# Patient Record
Sex: FEMALE | Race: OTHER | Employment: FULL TIME | URBAN - METROPOLITAN AREA
[De-identification: names, ages, dates, MRNs, and addresses within clinical notes are randomized per-mention and may not be internally consistent; named-entity substitution may affect disease eponyms.]

---

## 2022-11-25 ENCOUNTER — HOSPITAL ENCOUNTER (OUTPATIENT)
Age: 42
Discharge: HOME | End: 2022-11-25
Payer: COMMERCIAL

## 2022-11-25 VITALS
SYSTOLIC BLOOD PRESSURE: 127 MMHG | RESPIRATION RATE: 16 BRPM | HEART RATE: 86 BPM | OXYGEN SATURATION: 100 % | DIASTOLIC BLOOD PRESSURE: 80 MMHG | TEMPERATURE: 98 F

## 2022-11-25 DIAGNOSIS — J01.90 ACUTE NON-RECURRENT SINUSITIS, UNSPECIFIED LOCATION: Primary | ICD-10-CM

## 2022-11-25 LAB
POCT INFLUENZA A: NEGATIVE
POCT INFLUENZA B: NEGATIVE

## 2022-11-25 RX ORDER — AMOXICILLIN AND CLAVULANATE POTASSIUM 875; 125 MG/1; MG/1
1 TABLET, FILM COATED ORAL 2 TIMES DAILY
Qty: 14 TABLET | Refills: 0 | Status: SHIPPED | OUTPATIENT
Start: 2022-11-25 | End: 2022-12-02

## 2022-11-25 NOTE — ED INITIAL ASSESSMENT (HPI)
Patient with chest and sinus congestion, cough started 8 days ago.   Cold medications taken at home  Daughter is sick with a cold

## 2023-04-15 ENCOUNTER — APPOINTMENT (OUTPATIENT)
Dept: GENERAL RADIOLOGY | Age: 43
End: 2023-04-15
Attending: NURSE PRACTITIONER
Payer: COMMERCIAL

## 2023-04-15 ENCOUNTER — HOSPITAL ENCOUNTER (OUTPATIENT)
Age: 43
Discharge: HOME | End: 2023-04-15
Payer: COMMERCIAL

## 2023-04-15 VITALS
HEART RATE: 56 BPM | TEMPERATURE: 98 F | OXYGEN SATURATION: 99 % | DIASTOLIC BLOOD PRESSURE: 66 MMHG | SYSTOLIC BLOOD PRESSURE: 125 MMHG | RESPIRATION RATE: 17 BRPM

## 2023-04-15 DIAGNOSIS — R07.89 CHEST PAIN, MUSCULOSKELETAL: Primary | ICD-10-CM

## 2023-04-15 LAB
#MXD IC: 0.5 X10ˆ3/UL (ref 0.1–1)
ATRIAL RATE: 54 BPM
B-HCG UR QL: NEGATIVE
BUN BLD-MCNC: 11 MG/DL (ref 7–18)
CHLORIDE BLD-SCNC: 105 MMOL/L (ref 98–112)
CO2 BLD-SCNC: 25 MMOL/L (ref 21–32)
CREAT BLD-MCNC: 0.6 MG/DL
DDIMER WHOLE BLOOD: <200 NG/ML DDU (ref ?–400)
GFR SERPLBLD BASED ON 1.73 SQ M-ARVRAT: 114 ML/MIN/1.73M2 (ref 60–?)
GLUCOSE BLD-MCNC: 96 MG/DL (ref 70–99)
HCT VFR BLD AUTO: 36.7 %
HCT VFR BLD CALC: 41 %
HGB BLD-MCNC: 12.3 G/DL
ISTAT IONIZED CALCIUM FOR CHEM 8: 1.21 MMOL/L (ref 1.12–1.32)
LYMPHOCYTES # BLD AUTO: 1.7 X10ˆ3/UL (ref 1–4)
LYMPHOCYTES NFR BLD AUTO: 28.7 %
MCH RBC QN AUTO: 28.1 PG (ref 26–34)
MCHC RBC AUTO-ENTMCNC: 33.5 G/DL (ref 31–37)
MCV RBC AUTO: 84 FL (ref 80–100)
MIXED CELL %: 8.5 %
NEUTROPHILS # BLD AUTO: 3.8 X10ˆ3/UL (ref 1.5–7.7)
NEUTROPHILS NFR BLD AUTO: 62.8 %
P AXIS: 36 DEGREES
P-R INTERVAL: 174 MS
PLATELET # BLD AUTO: 293 X10ˆ3/UL (ref 150–450)
POTASSIUM BLD-SCNC: 3.7 MMOL/L (ref 3.6–5.1)
Q-T INTERVAL: 432 MS
QRS DURATION: 74 MS
QTC CALCULATION (BEZET): 409 MS
R AXIS: 57 DEGREES
RBC # BLD AUTO: 4.37 X10ˆ6/UL
SODIUM BLD-SCNC: 141 MMOL/L (ref 136–145)
T AXIS: 33 DEGREES
TROPONIN I BLD-MCNC: <0.02 NG/ML
VENTRICULAR RATE: 54 BPM
WBC # BLD AUTO: 6 X10ˆ3/UL (ref 4–11)

## 2023-04-15 PROCEDURE — 84484 ASSAY OF TROPONIN QUANT: CPT | Performed by: NURSE PRACTITIONER

## 2023-04-15 PROCEDURE — 99214 OFFICE O/P EST MOD 30 MIN: CPT | Performed by: NURSE PRACTITIONER

## 2023-04-15 PROCEDURE — 93000 ELECTROCARDIOGRAM COMPLETE: CPT | Performed by: NURSE PRACTITIONER

## 2023-04-15 PROCEDURE — 80047 BASIC METABLC PNL IONIZED CA: CPT | Performed by: NURSE PRACTITIONER

## 2023-04-15 PROCEDURE — 71046 X-RAY EXAM CHEST 2 VIEWS: CPT | Performed by: NURSE PRACTITIONER

## 2023-04-15 PROCEDURE — 85025 COMPLETE CBC W/AUTO DIFF WBC: CPT | Performed by: NURSE PRACTITIONER

## 2023-04-15 PROCEDURE — 96360 HYDRATION IV INFUSION INIT: CPT | Performed by: NURSE PRACTITIONER

## 2023-04-15 PROCEDURE — 81025 URINE PREGNANCY TEST: CPT | Performed by: NURSE PRACTITIONER

## 2023-04-15 RX ORDER — CYCLOBENZAPRINE HCL 10 MG
TABLET ORAL 3 TIMES DAILY PRN
Qty: 15 TABLET | Refills: 0 | Status: SHIPPED | OUTPATIENT
Start: 2023-04-15 | End: 2023-04-22

## 2023-04-15 RX ORDER — 0.9 % SODIUM CHLORIDE 0.9 %
1000 INTRAVENOUS SOLUTION INTRAVENOUS ONCE
Status: COMPLETED | OUTPATIENT
Start: 2023-04-15 | End: 2023-04-15

## 2023-04-15 NOTE — PROGRESS NOTES
Patient with near syncopal episode after IV insertion. IVF initiated and patient reclined in chair. No injury   Patient feeling better with interventions.

## 2023-04-15 NOTE — ED INITIAL ASSESSMENT (HPI)
Since Monday night, patient with dull ache over left chest area. Patient states that pain is constant and reproducible with movement and stretching arm. On Thursday, patient with some SOB while on walk. Yesterday, patient was golfing and pain returned when she swung the gold club.      Patient contributes pain to when she had her back cracked by her family while laying on stomach on Saturday, then pain started 1 day later    No viral sx   No night sweats/ chills     Pain reproducible when area pressed on

## 2024-02-26 ENCOUNTER — TELEMEDICINE (OUTPATIENT)
Dept: INTERNAL MEDICINE CLINIC | Facility: CLINIC | Age: 44
End: 2024-02-26

## 2024-02-26 DIAGNOSIS — R73.03 BORDERLINE DIABETES: Primary | ICD-10-CM

## 2024-02-26 DIAGNOSIS — K76.0 FATTY LIVER: ICD-10-CM

## 2024-02-26 DIAGNOSIS — N92.6 IRREGULAR PERIODS: ICD-10-CM

## 2024-02-26 DIAGNOSIS — Z00.00 PHYSICAL EXAM, ANNUAL: ICD-10-CM

## 2024-02-26 DIAGNOSIS — Z12.31 SCREENING MAMMOGRAM, ENCOUNTER FOR: ICD-10-CM

## 2024-02-26 DIAGNOSIS — Z01.419 ENCOUNTER FOR ROUTINE GYNECOLOGICAL EXAMINATION WITH PAPANICOLAOU SMEAR OF CERVIX: ICD-10-CM

## 2024-02-26 NOTE — PROGRESS NOTES
Patient ID: Ovidio Ponce is a 43 year old female.  No chief complaint on file.         HISTORY OF PRESENT ILLNESS:   Patient presents for above.  This visit is conducted using Telemedicine with live, interactive video and audio.  C/c new pt   C/o moved to [place] 2 yrs ago from the [facility]   Has been told she had fatty liver   Has irregular periods   Gyn -used to be seen in the city     PMH  H/o gestational diabetes was on insulin with second daughter but watched what she ate so no insulin   Borderline diabetes       Lives with  and 3 daughters  and works in [employer]       Review [employer]   MEDICAL HISTORY:   History reviewed. No pertinent past medical history.    Past Surgical History:   Procedure Laterality Date    OTHER SURGICAL HISTORY      lithotripsy x 2 for kidney stones       No current outpatient medications on file.    Allergies:No Known Allergies    Social History     Socioeconomic History    Marital status:      Spouse name: Not on file    Number of children: Not on file    Years of education: Not on file    Highest education level: Not on file   Occupational History    Not on file   Tobacco Use    Smoking status: Never    Smokeless tobacco: Never   Vaping Use    Vaping Use: Never used   Substance and Sexual Activity    Alcohol use: Never    Drug use: Never    Sexual activity: Not on file   Other Topics Concern    Not on file   Social History Narrative    Not on file     Social Determinants of Health     Financial Resource Strain: Not on file   Food Insecurity: Not on file   Transportation Needs: Not on file   Physical Activity: Not on file   Stress: Not on file   Social Connections: Not on file   Housing Stability: Not on file       PHYSICAL EXAM:   Unable to perform vitals or do physical exam as this is a virtual video visit.  Patient appears alert  And oriented x 3, no acute distress  Patient speaking complete sentences without any conversational dyspnea or respiratory distress  No  coughing heard      ASSESSMENT/PLAN:   1. Borderline diabetes  - Hemoglobin A1C    2. Fatty liver    3. Irregular periods  - OBG - INTERNAL    4. Screening mammogram, encounter for  - [facility] NIDHI 2D+3D SCREENING BILAT (CPT=77067/89268); Future    5. Physical exam, annual  - Comp Metabolic Panel (14)  - Lipid Panel  - Assay, Thyroid Stim Hormone  - CBC, Platelet; No Differential  - Hemoglobin A1C    6. Encounter for routine gynecological examination with Papanicolaou smear of cervix  - OBG - INTERNAL  Ordered labs to be done fasting before her visit  Follow a low-carb low sugar diet and continue following a healthy diet and watching what she eats and exercising and we will check A1c  For her fatty liver we will check her liver enzymes first and may need imaging studies done    No follow-ups on file.    Time spent on encounter  22 minutes   Video time 22 minutes   Documentation time 22 minutes     Josedouglas Kris understands video evaluation is not a substitute for face-to-face examination or emergency care. Patient advised to go to ER or call 911 for worsening symptoms or acute distress.     Telehealth outside of [facility]  Telehealth Verbal Consent   I conducted a telehealth visit with Ovidio Ponce today, 02/26/24, which was completed using two-way, real-time interactive audio and video communication. This has been done in good jacki to provide continuity of care in the best interest of the provider-patient relationship, due to the COVID -19 public health crisis/national emergency where restrictions of face-to-face office visits are ongoing. Every conscious effort was taken to allow for sufficient and adequate time to complete the visit.  The patient was made aware of the limitations of the telehealth visit, including treatment limitations as no physical exam could be performed.  The patient was advised to call 911 or to go to the ER in case there was an emergency.  The patient was also advised of the potential  privacy & security concerns related to the telehealth platform.   The patient was made aware of where to find [facility]'s notice of privacy practices, telehealth consent form and other related consent forms and documents.  which are located on the [facility] website. The patient verbally agreed to telehealth consent form, related consents and the risks discussed.    Lastly, the patient confirmed that they were in [place].   Included in this visit, time may have been spent reviewing labs, medications, radiology tests and decision making. Appropriate medical decision-making and tests are ordered as detailed in the plan of care above.  Coding/billing information is submitted for this visit based on complexity of care and/or time spent for the visit.    This note was prepared using Dragon Medical voice recognition dictation software. As a result errors may occur. When identified these errors have been corrected. While every attempt is made to correct errors during dictation discrepancies may still exist.    Yessi Lindo MD  2/26/2024

## 2024-03-07 ENCOUNTER — TELEPHONE (OUTPATIENT)
Dept: OBGYN CLINIC | Facility: CLINIC | Age: 44
End: 2024-03-07

## 2024-03-07 NOTE — TELEPHONE ENCOUNTER
Provider EB canceling office 04/01/24. LM on pt's voicemail that appointment on 04/01 will be canceled and to call back to reschedule. [employer] message sent to pt.

## 2024-03-15 ENCOUNTER — HOSPITAL ENCOUNTER (OUTPATIENT)
Dept: MAMMOGRAPHY | Age: 44
Discharge: HOME | End: 2024-03-15
Attending: INTERNAL MEDICINE
Payer: COMMERCIAL

## 2024-03-15 DIAGNOSIS — Z12.31 SCREENING MAMMOGRAM, ENCOUNTER FOR: ICD-10-CM

## 2024-03-15 PROCEDURE — 77067 SCR MAMMO BI INCL CAD: CPT | Performed by: INTERNAL MEDICINE

## 2024-03-15 PROCEDURE — 77063 BREAST TOMOSYNTHESIS BI: CPT | Performed by: INTERNAL MEDICINE

## 2024-03-18 ENCOUNTER — LAB ENCOUNTER (OUTPATIENT)
Dept: LAB | Age: 44
End: 2024-03-18
Attending: OBSTETRICS & GYNECOLOGY
Payer: COMMERCIAL

## 2024-03-18 ENCOUNTER — OFFICE VISIT (OUTPATIENT)
Dept: SURGERY | Facility: CLINIC | Age: 44
End: 2024-03-18
Payer: COMMERCIAL

## 2024-03-18 VITALS
SYSTOLIC BLOOD PRESSURE: 110 MMHG | DIASTOLIC BLOOD PRESSURE: 78 MMHG | HEIGHT: 65 IN | WEIGHT: 145 LBS | BODY MASS INDEX: 24.16 KG/M2

## 2024-03-18 DIAGNOSIS — Z01.419 WOMEN'S ANNUAL ROUTINE GYNECOLOGICAL EXAMINATION: ICD-10-CM

## 2024-03-18 DIAGNOSIS — N92.6 IRREGULAR PERIODS: ICD-10-CM

## 2024-03-18 DIAGNOSIS — Z12.4 SCREENING FOR CERVICAL CANCER: Primary | ICD-10-CM

## 2024-03-18 LAB
ALBUMIN SERPL-MCNC: 4.7 G/DL (ref 3.2–4.8)
ALBUMIN/GLOB SERPL: 1.7 {RATIO} (ref 1–2)
ALP LIVER SERPL-CCNC: 58 U/L
ALT SERPL-CCNC: 16 U/L
ANION GAP SERPL CALC-SCNC: 6 MMOL/L (ref 0–18)
AST SERPL-CCNC: 16 U/L (ref ?–34)
BILIRUB SERPL-MCNC: 0.5 MG/DL (ref 0.3–1.2)
BUN BLD-MCNC: 12 MG/DL (ref 9–23)
BUN/CREAT SERPL: 17.4 (ref 10–20)
CALCIUM BLD-MCNC: 9.5 MG/DL (ref 8.7–10.4)
CHLORIDE SERPL-SCNC: 109 MMOL/L (ref 98–112)
CHOLEST SERPL-MCNC: 189 MG/DL (ref ?–200)
CO2 SERPL-SCNC: 28 MMOL/L (ref 21–32)
CREAT BLD-MCNC: 0.69 MG/DL
DEPRECATED RDW RBC AUTO: 37.6 FL (ref 35.1–46.3)
EGFRCR SERPLBLD CKD-EPI 2021: 110 ML/MIN/1.73M2 (ref 60–?)
ERYTHROCYTE [DISTWIDTH] IN BLOOD BY AUTOMATED COUNT: 11.8 % (ref 11–15)
EST. AVERAGE GLUCOSE BLD GHB EST-MCNC: 114 MG/DL (ref 68–126)
FASTING PATIENT LIPID ANSWER: NO
FASTING STATUS PATIENT QL REPORTED: NO
FSH SERPL-ACNC: 55 MIU/ML
GLOBULIN PLAS-MCNC: 2.7 G/DL (ref 2.8–4.4)
GLUCOSE BLD-MCNC: 111 MG/DL (ref 70–99)
HBA1C MFR BLD: 5.6 % (ref ?–5.7)
HCT VFR BLD AUTO: 39.7 %
HDLC SERPL-MCNC: 74 MG/DL (ref 40–59)
HGB BLD-MCNC: 12.7 G/DL
LDLC SERPL CALC-MCNC: 103 MG/DL (ref ?–100)
LH SERPL-ACNC: 17.1 MIU/ML
MCH RBC QN AUTO: 27.8 PG (ref 26–34)
MCHC RBC AUTO-ENTMCNC: 32 G/DL (ref 31–37)
MCV RBC AUTO: 86.9 FL
NONHDLC SERPL-MCNC: 115 MG/DL (ref ?–130)
OSMOLALITY SERPL CALC.SUM OF ELEC: 296 MOSM/KG (ref 275–295)
PLATELET # BLD AUTO: 291 10(3)UL (ref 150–450)
POTASSIUM SERPL-SCNC: 4.2 MMOL/L (ref 3.5–5.1)
PROT SERPL-MCNC: 7.4 G/DL (ref 5.7–8.2)
RBC # BLD AUTO: 4.57 X10(6)UL
SODIUM SERPL-SCNC: 143 MMOL/L (ref 136–145)
T4 FREE SERPL-MCNC: 1.2 NG/DL (ref 0.8–1.7)
TRIGL SERPL-MCNC: 64 MG/DL (ref 30–149)
TSI SER-ACNC: 1.99 MIU/ML (ref 0.55–4.78)
VLDLC SERPL CALC-MCNC: 11 MG/DL (ref 0–30)
WBC # BLD AUTO: 5.2 X10(3) UL (ref 4–11)

## 2024-03-18 PROCEDURE — 83001 ASSAY OF GONADOTROPIN (FSH): CPT

## 2024-03-18 PROCEDURE — 80061 LIPID PANEL: CPT | Performed by: INTERNAL MEDICINE

## 2024-03-18 PROCEDURE — 80053 COMPREHEN METABOLIC PANEL: CPT | Performed by: INTERNAL MEDICINE

## 2024-03-18 PROCEDURE — 83002 ASSAY OF GONADOTROPIN (LH): CPT

## 2024-03-18 PROCEDURE — 85027 COMPLETE CBC AUTOMATED: CPT | Performed by: INTERNAL MEDICINE

## 2024-03-18 PROCEDURE — 84439 ASSAY OF FREE THYROXINE: CPT | Performed by: INTERNAL MEDICINE

## 2024-03-18 PROCEDURE — 87624 HPV HI-RISK TYP POOLED RSLT: CPT | Performed by: OBSTETRICS & GYNECOLOGY

## 2024-03-18 PROCEDURE — 83036 HEMOGLOBIN GLYCOSYLATED A1C: CPT | Performed by: INTERNAL MEDICINE

## 2024-03-18 PROCEDURE — 36415 COLL VENOUS BLD VENIPUNCTURE: CPT | Performed by: INTERNAL MEDICINE

## 2024-03-18 PROCEDURE — 84410 TESTOSTERONE BIOAVAILABLE: CPT

## 2024-03-18 PROCEDURE — 84443 ASSAY THYROID STIM HORMONE: CPT | Performed by: INTERNAL MEDICINE

## 2024-03-18 RX ORDER — SUMATRIPTAN 50 MG/1
TABLET, FILM COATED ORAL
COMMUNITY

## 2024-03-18 RX ORDER — MULTIVIT-MIN/IRON/FOLIC ACID/K 18-600-40
CAPSULE ORAL
COMMUNITY

## 2024-03-18 NOTE — PROGRESS NOTES
NEW GYN H&P     3/18/2024  10:13 AM    Chief Complaint   Patient presents with    New Patient     Annual exam/pap     Irregular Periods     Pt c/o irregular periods after stopping ocp's in 10/2023   .    HPI: Patient is a 43 year old  LMP 10/1/2023 here to establish care - due for annual exam and PAP. Reports longstanding irregular periods since  that began after COVID vaccine that improved when placed on OCPS but unable to tolerate side effects so discontinued 10/2023 with no further periods since then. Minimal vasomotor symptoms other than mild night sweats. Discussed evaluation with hormonal profile and TVUSN to assess ovaries and uterus then returning to review results. No other gynecologic concerns or complaints.    Patient's last menstrual period was 10/01/2023 (approximate).    OB History    Para Term  AB Living   3 3 2 1   3   SAB IAB Ectopic Multiple Live Births           3      # Outcome Date GA Lbr Jay/2nd Weight Sex Delivery Anes PTL Lv   3 Term     F NORMAL SPONT   ZAINAB   2 Term 11 40w0d   F NORMAL SPONT   ZAINAB   1  08 36w0d   F NORMAL SPONT   ZAINAB       GYN hx:    Hx Prior Abnormal Pap: No  Pap Date: 10/01/22  Pap Result Notes: per pt last pap done 10/2022 with prev GYNE  Follow Up Recommendation: Last Mammo: 03/15/2024-still pending  CONTRACEPTION: None  LAST MAMMOGRAM: 3/15/2024 - results pending      Current Outpatient Medications   Medication Sig Dispense Refill    SUMAtriptan 50 MG Oral Tab Take one tablet at the first sign of a headache, May repeat in 2 hours if needed, a third dose may be taken 8 hours if needed. No more three doses in 24 hours.      Cholecalciferol (VITAMIN D) 50 MCG ( UT) Oral Cap Take by mouth.         Past Medical History:   Diagnosis Date    Anxiety When I started birth control a couple a year ago    Depression [id number]    Hyperlipidemia     Irregular periods      Past Surgical History:   Procedure Laterality Date     OTHER SURGICAL HISTORY      lithotripsy x 2 for kidney stones     No Known Allergies  Family History   Problem Relation Age of Onset    Other (hyperthyroidism) Mother     Hypertension Father     Diabetes Father     Heart Disorder Father     Ovarian Cancer Maternal Aunt         post    Breast Cancer Paternal Aunt         age unk    Pancreatic Cancer Maternal Uncle         post     Social History     Socioeconomic History    Marital status:    Tobacco Use    Smoking status: Never     Passive exposure: Yes    Smokeless tobacco: Never    Tobacco comments:     Sparingly during that time   Vaping Use    Vaping Use: Never used   Substance and Sexual Activity    Alcohol use: Never    Drug use: Never     Social History     Social History Narrative    Not on file       ROS:     Review of Systems:  A comprehensive 10 point ROS was completed. All pertinent positives and negatives noted in the HPI.     /78   Ht 65\"   Wt 145 lb (65.8 kg)   LMP 10/01/2023 (Approximate)   BMI 24.13 kg/m²     Exam:   GENERAL: well developed, well nourished, in no apparent distress  SKIN: no rashes, no lesions  HEENT: normal  LUNGS: respiration unlabored  CARDIOVASCULAR: no peripheral edema or varicosities, skin warm and dry  BREASTS: bilaterally nontender, no palpable masses, no nipple discharge, no skin changes, no axillary adenopathy  ABDOMEN: Soft, non distended; non tender, no masses  GYNE/:   External Genitalia: normal, no lesions, good perineal support  Urethra: meatus normal   Bladder: well supported  Vagina: normal mucosa, no lesions, no discharge   Uterus: normal size, mobile, nontender  Cervix: normal os, no lesions or bleeding  Adnexa:normal size, bilaterally nontender, no palpable masses  Cul-de-sac: normal  R/V: normal perineum, no hemorrhoids  EXTREMITIES:  nontender without edema      A/P: Patient is 43 year old female     1. Women's annual routine gynecological examination  - PAP today    2. Irregular periods  -  Pelvic USN ordered  - Check ovarian labs      Total time spent = 30 minutes  >50% visit = face to face counseling and coordination of care        3/18/2024  Marlene Newell MD

## 2024-03-21 LAB
SEX HORM BIND GLOB: 48 NMOL/L
TESTOST % FREE+WEAK BND: 9.2 %
TESTOST FREE+WEAK BND: 1.4 NG/DL
TESTOSTERONE TOT /MS: 15.6 NG/DL

## 2024-03-21 NOTE — PROGRESS NOTES
Released to [employer] and message from provider/results was viewed by patient.    Seen by patient Ovidio Ponce on 3/18/2024  8:55 PM

## 2024-04-01 ENCOUNTER — OFFICE VISIT (OUTPATIENT)
Dept: INTERNAL MEDICINE CLINIC | Facility: CLINIC | Age: 44
End: 2024-04-01

## 2024-04-01 VITALS
BODY MASS INDEX: 24.06 KG/M2 | RESPIRATION RATE: 18 BRPM | DIASTOLIC BLOOD PRESSURE: 69 MMHG | SYSTOLIC BLOOD PRESSURE: 102 MMHG | OXYGEN SATURATION: 99 % | HEART RATE: 64 BPM | WEIGHT: 144.38 LBS | HEIGHT: 65 IN

## 2024-04-01 DIAGNOSIS — E07.89 THYROID FULLNESS: ICD-10-CM

## 2024-04-01 DIAGNOSIS — Z00.00 PHYSICAL EXAM, ANNUAL: Primary | ICD-10-CM

## 2024-04-01 DIAGNOSIS — E55.9 VITAMIN D DEFICIENCY: ICD-10-CM

## 2024-04-01 DIAGNOSIS — Z12.11 COLON CANCER SCREENING: ICD-10-CM

## 2024-04-01 PROCEDURE — 99396 PREV VISIT EST AGE 40-64: CPT | Performed by: INTERNAL MEDICINE

## 2024-04-01 NOTE — PROGRESS NOTES
Ovidio Ponce is a 43 year old female.  Chief Complaint   Patient presents with    Physical     Annual        HPI:   Pt comes for her  annual physical  C/C annual physical  C/o overall doing well  Came back from [place]-encounter for work          HISTORY  2/2024  C/c new pt   C/o moved to [place] 2 yrs ago from the [facility]   Has been told she had fatty liver   Has irregular periods   Gyn -used to be seen in the city      PMH  H/o gestational diabetes was on insulin with second daughter but watched what she ate so no insulin   Borderline diabetes   Vit d def         Lives with  and 3 daughters  and works in HR      Current Outpatient Medications   Medication Sig Dispense Refill    SUMAtriptan 50 MG Oral Tab Take one tablet at the first sign of a headache, May repeat in 2 hours if needed, a third dose may be taken 8 hours if needed. No more three doses in 24 hours.      Cholecalciferol (VITAMIN D) 50 MCG (2000 UT) Oral Cap Take by mouth.        Past Medical History:   Diagnosis Date    Anxiety When I started birth control a couple a year ago    Calculus of kidney 2018    Depression [id number]    Hyperlipidemia 2022    Irregular periods       Past Surgical History:   Procedure Laterality Date    Other surgical history      lithotripsy x 2 for kidney stones      Social History:  Social History     Socioeconomic History    Marital status:    Tobacco Use    Smoking status: Passive Smoke Exposure - Never Smoker     Passive exposure: Yes    Smokeless tobacco: Never    Tobacco comments:     Sparingly during that time   Vaping Use    Vaping Use: Never used   Substance and Sexual Activity    Alcohol use: Never    Drug use: Never   Other Topics Concern    Caffeine Concern No    Exercise Yes     Comment: 2/3 times a week    Seat Belt Yes    Special Diet No    Stress Concern Yes    Weight Concern No        REVIEW OF SYSTEMS:   GENERAL HEALTH: No fevers, chills, sweats, fatigue  VISION: No recent vision problems,  blurry vision or double vision-wears contacts   HEENT: No decreased hearing ear pain nasal congestion or sore throat  SKIN: denies any unusual skin lesions or rashes  RESPIRATORY: denies shortness of breath, cough, wheezing  CARDIOVASCULAR: denies chest pain on exertion, palpitations, swelling in feet  GI: denies abdominal pain and denies heartburn, nausea or vomiting  : No Pain on urination, change in the color of urine, discharge, urinating frequently  MUS: No back pain, joint pain, muscle pain  NEURO: denies headaches- occ  , no anxiety, depression- better since stopping the ocps     EXAM:   /69 (BP Location: Right arm, Patient Position: Sitting, Cuff Size: adult)   Pulse 64   Resp 18   Ht 5' 5\" (1.651 m)   Wt 144 lb 6.4 oz (65.5 kg)   LMP 10/01/2023 (Approximate)   SpO2 99%   BMI 24.03 kg/m²   GENERAL: well developed, well nourished,in no apparent distress  SKIN: no rashes,no suspicious lesions  HEENT: atraumatic, normocephalic,ears and throat are clear, frontal maxillary sinus tenderness, pupils equal reactive to light bilaterally, extract muscles intact  NECK: supple,no adenopathy, + fullness of the an neck   LUNGS: clear to auscultation, no wheeze  CARDIO: RRR without murmur  GI: good BS's,no masses or tenderness  EXTREMITIES: no cyanosis, or edema    ASSESSMENT AND PLAN:   Diagnoses and all orders for this visit:    Physical exam, annual  Advised patient to watch her sheets exercise, seatbelt use no texting driving, sunscreen use advised  Colon cancer screening  -     [facility] GI Telephone Colon Screen  -     [facility]STRO - INTERNAL  Will refer  Vitamin D deficiency  -     Vitamin D; Future  Reordered to be done with the next blood draw  Thyroid fullness  -     US THYROID (CPT=76536); Future  Will check      Preventative medicine  Labs- 3/2024  Mammogram 3/2024   Pap smear 3/2024 dr nowak   Colonoscopy-refer    The patient indicates understanding of these issues and agrees to the plan.  No follow-ups  on file.

## 2024-04-04 ENCOUNTER — HOSPITAL ENCOUNTER (OUTPATIENT)
Dept: ULTRASOUND IMAGING | Age: 44
Discharge: HOME | End: 2024-04-04
Attending: INTERNAL MEDICINE
Payer: COMMERCIAL

## 2024-04-04 ENCOUNTER — LAB ENCOUNTER (OUTPATIENT)
Dept: LAB | Facility: REFERENCE LAB | Age: 44
End: 2024-04-04
Attending: INTERNAL MEDICINE
Payer: COMMERCIAL

## 2024-04-04 DIAGNOSIS — E55.9 VITAMIN D DEFICIENCY: ICD-10-CM

## 2024-04-04 DIAGNOSIS — E07.89 THYROID FULLNESS: ICD-10-CM

## 2024-04-04 LAB — VIT D+METAB SERPL-MCNC: 20.6 NG/ML (ref 30–100)

## 2024-04-04 PROCEDURE — 82306 VITAMIN D 25 HYDROXY: CPT

## 2024-04-04 PROCEDURE — 36415 COLL VENOUS BLD VENIPUNCTURE: CPT

## 2024-04-04 PROCEDURE — 76536 US EXAM OF HEAD AND NECK: CPT | Performed by: INTERNAL MEDICINE

## 2024-04-05 DIAGNOSIS — E55.9 VITAMIN D DEFICIENCY: Primary | ICD-10-CM

## 2024-04-05 RX ORDER — ERGOCALCIFEROL 1.25 MG/1
50000 CAPSULE ORAL WEEKLY
Qty: 4 CAPSULE | Refills: 3 | Status: SHIPPED | OUTPATIENT
Start: 2024-04-05

## 2024-04-07 ENCOUNTER — OFFICE VISIT (OUTPATIENT)
Dept: FAMILY MEDICINE CLINIC | Facility: CLINIC | Age: 44
End: 2024-04-07
Payer: COMMERCIAL

## 2024-04-07 VITALS
OXYGEN SATURATION: 96 % | SYSTOLIC BLOOD PRESSURE: 119 MMHG | TEMPERATURE: 98 F | HEART RATE: 69 BPM | BODY MASS INDEX: 23.66 KG/M2 | WEIGHT: 142 LBS | RESPIRATION RATE: 18 BRPM | HEIGHT: 65 IN | DIASTOLIC BLOOD PRESSURE: 74 MMHG

## 2024-04-07 DIAGNOSIS — J06.9 VIRAL URI WITH COUGH: Primary | ICD-10-CM

## 2024-04-07 PROCEDURE — 99213 OFFICE O/P EST LOW 20 MIN: CPT | Performed by: NURSE PRACTITIONER

## 2024-04-24 ENCOUNTER — HOSPITAL ENCOUNTER (OUTPATIENT)
Dept: ULTRASOUND IMAGING | Age: 44
Discharge: HOME | End: 2024-04-24
Attending: OBSTETRICS & GYNECOLOGY
Payer: COMMERCIAL

## 2024-04-24 DIAGNOSIS — N92.6 IRREGULAR PERIODS: ICD-10-CM

## 2024-04-24 PROCEDURE — 76830 TRANSVAGINAL US NON-OB: CPT | Performed by: OBSTETRICS & GYNECOLOGY

## 2024-04-24 PROCEDURE — 76856 US EXAM PELVIC COMPLETE: CPT | Performed by: OBSTETRICS & GYNECOLOGY

## 2024-05-03 ENCOUNTER — OFFICE VISIT (OUTPATIENT)
Dept: SURGERY | Facility: CLINIC | Age: 44
End: 2024-05-03
Payer: COMMERCIAL

## 2024-05-03 DIAGNOSIS — N92.6 IRREGULAR PERIODS: Primary | ICD-10-CM

## 2024-05-03 PROCEDURE — 99213 OFFICE O/P EST LOW 20 MIN: CPT | Performed by: OBSTETRICS & GYNECOLOGY

## 2024-05-03 NOTE — PROGRESS NOTES
Ovidio Ponce is a 43 year old female.    HPI:     Chief Complaint   Patient presents with    Other     USN follow up       USN 4/24/24 results reviewed = normal uterus with 10 mm EM lining with 2 mm calcification and normal ovaries bilaterally. Still no menses since 10/2023. Discussed time interval of 12 consecutive months without a cycle as defining menopause. Minimal hormonal or vasomotor symptoms. Will continue to track both menses and whether symptoms develop.    Medications (Active prior to today's visit):  Current Outpatient Medications   Medication Sig Dispense Refill    ergocalciferol 1.25 MG (59452 UT) Oral Cap Take 1 capsule (50,000 Units total) by mouth once a week. 4 capsule 3    SUMAtriptan 50 MG Oral Tab Take one tablet at the first sign of a headache, May repeat in 2 hours if needed, a third dose may be taken 8 hours if needed. No more three doses in 24 hours.      Cholecalciferol (VITAMIN D) 50 MCG (2000 UT) Oral Cap Take by mouth.         Allergies:  No Known Allergies    LMP 10/01/2023 (Approximate)      ASSESSMENT/PLAN:   Assessment       1. Irregular periods - perimenopausal LMP 10/2023  - Normal USN  - Track whether cycles or symptoms until menopause 10/2024      Total time spent = 20 minutes  >50% of visit = face to face discussion and coordination of care        5/3/2024  Marlene Newell MD

## 2024-08-19 DIAGNOSIS — E55.9 VITAMIN D DEFICIENCY: ICD-10-CM

## 2024-08-21 RX ORDER — ERGOCALCIFEROL 1.25 MG/1
50000 CAPSULE, LIQUID FILLED ORAL WEEKLY
Qty: 4 CAPSULE | Refills: 3 | OUTPATIENT
Start: 2024-08-21

## 2024-08-21 NOTE — TELEPHONE ENCOUNTER
Please review. Protocol Failed; No Protocol        Component  Ref Range & Units 4/4/24 11:37 AM   Vitamin D, 25OH, Total  30.0 - 100.0 ng/mL 20.6 Low           Requested Prescriptions   Pending Prescriptions Disp Refills    ERGOCALCIFEROL 1.25 MG (54829 UT) Oral Cap [Pharmacy Med Name: VITAMIN D2 50,000IU (ERGO) CAP RX] 4 capsule 3     Sig: TAKE 1 CAPSULE BY MOUTH 1 TIME A WEEK       There is no refill protocol information for this order            Future Appointments         Provider Department Appt Notes    In 1 month GI COLON SCREENING [facility] [phone] TELEPHONE COLON SCREENING          Recent Outpatient Visits              3 months ago Irregular periods    [facility], [place] - OB/GYN Marlene Newell MD    Office Visit    4 months ago Viral URI with cough    [facility], [facility], [facility] Nora Jovel APRN    Office Visit    4 months ago Physical exam, annual    [facility]    Office Visit    5 months ago Screening for cervical cancer    [facility], [place], [place] - OB/Marlene Burns MD    Office Visit    5 months ago [facility]line [facility]    [facility]icine

## 2024-09-07 ENCOUNTER — HOSPITAL ENCOUNTER (OUTPATIENT)
Age: 44
Discharge: HOME | End: 2024-09-07
Payer: COMMERCIAL

## 2024-09-07 VITALS
SYSTOLIC BLOOD PRESSURE: 110 MMHG | DIASTOLIC BLOOD PRESSURE: 69 MMHG | HEART RATE: 57 BPM | RESPIRATION RATE: 18 BRPM | OXYGEN SATURATION: 100 % | TEMPERATURE: 98 F

## 2024-09-07 DIAGNOSIS — B35.1 ONYCHOMYCOSIS OF LEFT GREAT TOE: Primary | ICD-10-CM

## 2024-09-07 PROCEDURE — 99213 OFFICE O/P EST LOW 20 MIN: CPT | Performed by: PHYSICIAN ASSISTANT

## 2024-09-07 NOTE — ED PROVIDER NOTES
Patient Seen in: [facility]      History     Chief Complaint   Patient presents with    Rash Skin Problem     Stated Complaint: toe pain    Subjective:   HPI    43-year-old female presenting for evaluation of a discolored toenail.  Patient states she removed the nail polish from her toes a few days ago and noted the left great toenail to be yellow/brown in color.  There is no associated pain, drainage to the nail.  She is concerned that the nail appears thicker than the others.    Objective:   Past Medical History:    Anxiety    Calculus of kidney    Depression    Hyperlipidemia    Irregular periods              Past Surgical History:   Procedure Laterality Date    Other surgical history      lithotripsy x 2 for kidney stones                Social History     Socioeconomic History    Marital status:    Tobacco Use    Smoking status: Passive Smoke Exposure - Never Smoker     Passive exposure: Yes    Smokeless tobacco: Never    Tobacco comments:     Sparingly during that time   Vaping Use    Vaping status: Never Used   Substance and Sexual Activity    Alcohol use: Never    Drug use: Never   Other Topics Concern    Caffeine Concern No    Exercise Yes     Comment: 2/3 times a week    Seat Belt Yes    Special Diet No    Stress Concern Yes    Weight Concern No     Social Determinants of Health     Financial Resource Strain: Not on File (9/10/2022)    [facility] from [facility]    Financial Resource Strain     Financial Resource Strain: 0   Food Insecurity: Not on File (9/10/2022)    [facility] from [facility]    Food Insecurity     Food: 0   Transportation Needs: Not on File (9/10/2022)    [facility] from [facility]    Transportation Needs     Transportation: 0   Physical Activity: Not on File (9/10/2022)    [facility] from [facility]    Physical Activity     Physical Activity: 0   Stress: Not on File (9/10/2022)    [facility] from [facility]    Stress     Stress: 0   Social Connections: Not on File  (9/10/2022)    [facility] from [facility]    Social Connections     Social Connections and Isolation: 0    Received from [place]    [facility]              Review of Systems    Positive for stated Chief Complaint: Rash Skin Problem    Other systems are as noted in HPI.  Constitutional and vital signs reviewed.      All other systems reviewed and negative except as noted above.    Physical Exam     ED Triage Vitals [09/07/24 0940]   /69   Pulse 57   Resp 18   Temp 98.1 °F (36.7 °C)   Temp src Oral   SpO2 100 %   O2 Device        Current Vitals:   Vital Signs  BP: 110/69  Pulse: 57  Resp: 18  Temp: 98.1 °F (36.7 °C)  Temp src: Oral    Oxygen Therapy  SpO2: 100 %            Physical Exam  Vitals and nursing note reviewed.   Constitutional:       General: She is not in acute distress.  HENT:      Head: Normocephalic and atraumatic.      Right Ear: External ear normal.      Left Ear: External ear normal.      Nose: Nose normal.      Mouth/Throat:      Mouth: Mucous membranes are moist.   Eyes:      Extraocular Movements: Extraocular movements intact.      Pupils: Pupils are equal, round, and reactive to light.   Cardiovascular:      Rate and Rhythm: Normal rate.   Pulmonary:      Effort: Pulmonary effort is normal.   Abdominal:      General: Abdomen is flat.   Musculoskeletal:         General: Normal range of motion.      Cervical back: Normal range of motion.      Comments: The left great toenail is brownish in color with mild thickening.   Skin:     General: Skin is warm.   Neurological:      General: No focal deficit present.      Mental Status: She is alert and oriented to person, place, and time.   Psychiatric:         Mood and Affect: Mood normal.         Behavior: Behavior normal.               ED Course   Labs Reviewed - No data to display     43-year-old female presenting with concern for discoloration to the left great toenail  Ddx-onychomycosis, ingrown toenail, paronychia    Podiatry  follow-up recommended for further evaluation and treatment.              MDM                                         Medical Decision Making      Disposition and Plan     Clinical Impression:  1. Onychomycosis of left great toe         Disposition:  Discharge  9/7/2024 10:11 am    Follow-up:  Tia Redman DPM  [address] [address]  [phone]      podiatry    Bing Olson, ALVARO  [address] [address]  [phone]                Medications Prescribed:  Discharge Medication List as of 9/7/2024 10:11 AM

## 2024-10-15 ENCOUNTER — PATIENT MESSAGE (OUTPATIENT)
Dept: INTERNAL MEDICINE CLINIC | Facility: CLINIC | Age: 44
End: 2024-10-15

## 2024-10-18 ENCOUNTER — NURSE ONLY (OUTPATIENT)
Facility: CLINIC | Age: 44
End: 2024-10-18

## 2024-10-18 NOTE — PROGRESS NOTES
GI Staff:  [facility] Colon Screening Orders    Please schedule: Colonoscopy 39451 with MAC OR IV (if appropriate)    Please send split dose Golytely bowel prep     Diagnosis: Colon Screening Z12.11      Medication adjustments:Hold Vitamins 7 days prior to the procedure.    >>>Please inform patient if new medications are started after scheduling procedure they need to call clinic to notify us.

## 2024-10-18 NOTE — PROGRESS NOTES
Called patient for scheduled telephone colon screening/positive FIT result.   Medications, pharmacy, and allergies verified with the patient.   Please advise on colonoscopy and bowel prep orders.     Age 45-64 y/o (Y/N):   66-76 y/o ? Route to GI provider per rotation schedule   › GI MD preference:   › Insurance:  [employer]  › Last PCP Visit: 4/1/2024 with Dr Lindo  IF NO PCP within [facility] ? GI in-person consultation   › Last CBC drawn: 3/18/2024  › Date of positive FIT test: N/A  › H/W/BMI: 5'5\"/142 lb/23.6    Special comments/notes:  Telephone Colon Screening Questionnaire Yes No   Are you currently experiencing any new/abnormal GI symptoms? [] [x]   If yes, explain:     Rectal bleeding? [] [x]   Black stool? [] [x]   Dysphagia or food \"feeling stuck\" when eating? [] [x]   Intractable vomiting? [] [x]   Unexplained weight loss? [] [x]   First colonoscopy? [x] []   Family history of colon cancer? [] [x]   Any issues with anesthesia? [] [x]   If yes, explain:      Have you had a stroke, heart attack or stent placement in the last 12 months?  [] [x]   If yes ? GI in-person consultation      Personal history of resp. Issues/oxygen use/BOUBACAR/COPD [] [x]   If yes, CPAP/BiPAP?     History of devices (pacemaker/defibrillator) [] [x]   History of cardiac/CVA issues/(MI/stroke) (see above) [] [x]     Medications Yes  No   Anticoagulants  Anticoagulant (Except Aspirin) ? route to RN pool to request adjustments from prescribing provider  [] [x]   Diabetic Meds  PO ? hold DAY PRIOR and DAY OF procedure  Insulin ? route to RN pool to request adjustments from prescribing provider  [] [x]   Weight loss meds (Phentermine/Vyvanse/Saxsenda/etc): [] [x]   Iron/herbal/multivitamin supplement (RX/OTC):Multivitamins, Vit D [x] []   Usage of marijuana, CBD &/or vape products: [] [x]

## 2025-01-28 ENCOUNTER — APPOINTMENT (OUTPATIENT)
Dept: GENERAL RADIOLOGY | Age: 45
End: 2025-01-28
Attending: PHYSICIAN ASSISTANT
Payer: COMMERCIAL

## 2025-01-28 ENCOUNTER — TELEPHONE (OUTPATIENT)
Dept: INTERNAL MEDICINE CLINIC | Facility: CLINIC | Age: 45
End: 2025-01-28

## 2025-01-28 ENCOUNTER — HOSPITAL ENCOUNTER (OUTPATIENT)
Age: 45
Discharge: HOME | End: 2025-01-28
Payer: COMMERCIAL

## 2025-01-28 VITALS
OXYGEN SATURATION: 98 % | TEMPERATURE: 98 F | SYSTOLIC BLOOD PRESSURE: 113 MMHG | DIASTOLIC BLOOD PRESSURE: 79 MMHG | RESPIRATION RATE: 16 BRPM | HEART RATE: 80 BPM

## 2025-01-28 DIAGNOSIS — M51.379 DEGENERATION OF INTERVERTEBRAL DISC AT L5-S1 LEVEL: ICD-10-CM

## 2025-01-28 DIAGNOSIS — M54.16 LUMBAR RADICULOPATHY: Primary | ICD-10-CM

## 2025-01-28 LAB
B-HCG UR QL: NEGATIVE
BILIRUB UR QL STRIP: NEGATIVE
CLARITY UR: CLEAR
COLOR UR: YELLOW
GLUCOSE UR STRIP-MCNC: NEGATIVE MG/DL
HGB UR QL STRIP: NEGATIVE
KETONES UR STRIP-MCNC: NEGATIVE MG/DL
LEUKOCYTE ESTERASE UR QL STRIP: NEGATIVE
NITRITE UR QL STRIP: NEGATIVE
PH UR STRIP: 6.5 [PH]
PROT UR STRIP-MCNC: NEGATIVE MG/DL
SP GR UR STRIP: 1.01
UROBILINOGEN UR STRIP-ACNC: <2 MG/DL

## 2025-01-28 PROCEDURE — 81002 URINALYSIS NONAUTO W/O SCOPE: CPT | Performed by: PHYSICIAN ASSISTANT

## 2025-01-28 PROCEDURE — 81025 URINE PREGNANCY TEST: CPT | Performed by: PHYSICIAN ASSISTANT

## 2025-01-28 PROCEDURE — 72100 X-RAY EXAM L-S SPINE 2/3 VWS: CPT | Performed by: PHYSICIAN ASSISTANT

## 2025-01-28 PROCEDURE — 99214 OFFICE O/P EST MOD 30 MIN: CPT | Performed by: PHYSICIAN ASSISTANT

## 2025-01-28 RX ORDER — METHYLPREDNISOLONE 4 MG/1
TABLET ORAL
Qty: 1 EACH | Refills: 0 | Status: SHIPPED | OUTPATIENT
Start: 2025-01-28

## 2025-01-28 NOTE — TELEPHONE ENCOUNTER
Patient called requesting an immediate care follow up was told to follow up on Monday, patient declined other providers and nurse practitioners.

## 2025-01-28 NOTE — ED PROVIDER NOTES
Chief Complaint   Patient presents with    Back Pain       HPI:     Ovidio Ponce is a 44 year old female who presents for evaluation of right lower back pain over the last 2 weeks, denies injury or trauma.  Notes history of sciatica years ago without lumbar disc disease or injury.  Patient has taken Advil previously with Tylenol this week with no medications today, pain is currently a 6 out of 10 periodically referring down the posterior right thigh over the last few days.  Notes pain was worse with ambulation over the initial few days, ongoing at rest as well as exertion.  Denies associated fevers chills headache dizziness neck pain chest pain shortness of breath vomiting diarrhea dysuria hematuria vaginal bleeding discharge upper extremity weakness numbness or swelling, notes history of kidney stones with \"very different symptoms today\".  Patient ambulating without assistance on arrival.      PFSH    PFS asessment screens reviewed and agree.  Nurses notes reviewed I agree with documentation.    Family History   Problem Relation Age of Onset    Other (hyperthyroidism) Mother     Depression Mother     Hypertension Father     Diabetes Father     Heart Disorder Father     Ovarian Cancer Maternal Aunt         post    Breast Cancer Paternal Aunt         age unk    Pancreatic Cancer Maternal Uncle         post     Family history reviewed with patient/caregiver and is not pertinent to presenting problem.  Social History     Socioeconomic History    Marital status:      Spouse name: Not on file    Number of children: Not on file    Years of education: Not on file    Highest education level: Not on file   Occupational History    Not on file   Tobacco Use    Smoking status: Passive Smoke Exposure - Never Smoker     Passive exposure: Yes    Smokeless tobacco: Never    Tobacco comments:     Sparingly during that time   Vaping Use    Vaping status: Never Used   Substance and Sexual Activity    Alcohol use: Never     Drug use: Never    Sexual activity: Not on file   Other Topics Concern    Caffeine Concern No    Exercise Yes     Comment: 2/3 times a week    Seat Belt Yes    Special Diet No    Stress Concern Yes    Weight Concern No   Social History Narrative    Not on file     Social Drivers of Health     Financial Resource Strain: Not on File (9/10/2022)    [facility] from [facility]    Financial Resource Strain     Financial Resource Strain: 0   Food Insecurity: Not on File (9/10/2022)    [facility] from [facility]    Food Insecurity     Food: 0   Transportation Needs: Not on File (9/10/2022)    [facility] from [facility]    Transportation Needs     Transportation: 0   Physical Activity: Not on File (9/10/2022)    [facility] from [facility]    Physical Activity     Physical Activity: 0   Stress: Not on File (9/10/2022)    Received from [facility]    Stress     Stress: 0   Social Connections: Not on File (9/10/2022)    Received from [facility]    Social Connections     Social Connections and Isolation: 0   Housing Stability: At Risk (8/18/2023)    [facility]     Living Situation: Not on file     Housing Problems: Not on file         ROS:   Positive for stated complaint: Back pain.  All other systems reviewed and negative except as noted above.  Constitutional and Vital Signs Reviewed.      Physical Exam:     Findings:    /79   Pulse 80   Temp 98.2 °F (36.8 °C) (Oral)   Resp 16   LMP 10/19/2024 (Approximate)   SpO2 98%   GENERAL: well developed, well nourished, well hydrated, no distress  SKIN: good skin turgor, no obvious rashes  EXTREMITIES: Mild right paralumbar tenderness, no midline tenderness, no SI joint tenderness.  Normal straight leg raise bilaterally, 2 points commission bilateral extremity intact, DP pulse intact.  No cyanosis or edema. THAKKAR without difficulty  GI: No adnexal or CVA tenderness. Abd Soft, non-tender, normal bowel sounds  HEAD:  normocephalic, atraumatic  EYES: sclera non icteric bilateral, conjunctiva clear  NEURO: No focal deficits  PSYCH: Alert and oriented x3.  Answering questions appropriately.  Mood appropriate.    MDM/Assessment/Plan:   Orders for this encounter:    Orders Placed This Encounter    XR LUMBAR SPINE (MIN 2 VIEWS) (CPT=72100)     Order Specific Question:   What is the Relevant Clinical Indication / Reason for Exam?     Answer:   low back pain, r/o degenerative changes lower lumbar vs comprsesion     Order Specific Question:   Release to patient     Answer:   Immediate    POCT Urinalysis Dipstick    POCT Pregnancy, Urine    POCT Pregnancy, Urine    POCT Urine Dip    methylPREDNISolone (MEDROL) 4 MG Oral Tablet Therapy Pack     Sig: Dosepack: take as directed     Dispense:  1 each     Refill:  0       Labs performed this visit:  Recent Results (from the past 10 hours)   POCT Urinalysis Dipstick    Collection Time: 01/28/25  2:10 PM   Result Value Ref Range    Urine Color Yellow Yellow    Urine Clarity Clear Clear    Specific Gravity, Urine 1.015 1.005 - 1.030    PH, Urine 6.5 5.0 - 8.0    Protein urine Negative Negative mg/dL    Glucose, Urine Negative Negative mg/dL    Ketone, Urine Negative Negative mg/dL    Bilirubin, Urine Negative Negative    Blood, Urine Negative Negative    Nitrite Urine Negative Negative    Urobilinogen urine <2.0 <2.0 mg/dL    Leukocyte esterase urine Negative Negative   POCT Pregnancy, Urine    Collection Time: 01/28/25  2:14 PM   Result Value Ref Range    POCT Urine Pregnancy Negative Negative       MDM:  Urine grossly unremarkable, hCG negative, x-ray without acute concerns however disc narrowing L5/S1.  Exam and history most reflective of lumbar to colopathy versus sciatica.  Agrees with Medrol Dosepak and combination with over-the-counter analgesics and outpatient follow-up with her primary over the next week if ongoing or worsening symptoms, instructed on changes warranting emergent  reevaluation, happy with plan of care, no focal changes ambulated well upon disposition.    Diagnosis:    ICD-10-CM    1. Lumbar radiculopathy  M54.16 XR LUMBAR SPINE (MIN 2 VIEWS) (CPT=72100)     XR LUMBAR SPINE (MIN 2 VIEWS) (CPT=72100)      2. Degeneration of intervertebral disc at L5-S1 level  M51.379           All results reviewed and discussed with patient.  See AVS for detailed discharge instructions for your condition today.    Follow Up with:  Yessi Lindo MD  [address] [place] [address] [id number] [phone]    Schedule an appointment as soon as possible for a visit in 1 week  FOLLOW UP; READDRESS FOR PHYSICAL THERAPY VS OTHER MODALITY AT CLINICAL DISCRETION.

## 2025-01-28 NOTE — ED INITIAL ASSESSMENT (HPI)
Pt presents to the IC with c/o right lower back pain for the last 2 weeks. Pt has been resting for the last week but notes the pain has been more constant and radiates down her right leg. Tylenol taken for pain last night, nothing since. Pt cannot think of a specific injury prior to the pain starting.

## 2025-01-28 NOTE — DISCHARGE INSTRUCTIONS
Continue IBUPROFEN VS TYLENOL for pain with prescription over days ahead.     AVOID INGESTION OF NSAID/IBUPROFEN WITH PRESCRIPTION SIMULTANEOUSLY TO AVOID STOMACH IRRITATION.     READDRESS WITH YOUR DOCTOR OVER WEEK AHEAD.     READDRESS EMERGENTLY FOR BREAKTHROUGH CONCERNS/CHANGES.

## 2025-02-03 ENCOUNTER — OFFICE VISIT (OUTPATIENT)
Dept: INTERNAL MEDICINE CLINIC | Facility: CLINIC | Age: 45
End: 2025-02-03

## 2025-02-03 VITALS
DIASTOLIC BLOOD PRESSURE: 78 MMHG | OXYGEN SATURATION: 99 % | SYSTOLIC BLOOD PRESSURE: 130 MMHG | HEART RATE: 63 BPM | BODY MASS INDEX: 24.83 KG/M2 | WEIGHT: 149 LBS | HEIGHT: 65 IN

## 2025-02-03 DIAGNOSIS — M54.41 ACUTE RIGHT-SIDED LOW BACK PAIN WITH RIGHT-SIDED SCIATICA: Primary | ICD-10-CM

## 2025-02-03 PROCEDURE — 3078F DIAST BP <80 MM HG: CPT

## 2025-02-03 PROCEDURE — 3075F SYST BP GE 130 - 139MM HG: CPT

## 2025-02-03 PROCEDURE — 99214 OFFICE O/P EST MOD 30 MIN: CPT

## 2025-02-03 PROCEDURE — 3008F BODY MASS INDEX DOCD: CPT

## 2025-02-03 RX ORDER — MELOXICAM 15 MG/1
15 TABLET ORAL DAILY
Qty: 15 TABLET | Refills: 0 | Status: SHIPPED | OUTPATIENT
Start: 2025-02-03

## 2025-02-03 NOTE — PROGRESS NOTES
Subjective:   Ovidio Ponce is a 44 year old female who presents for Urgent Care F/u     Seen in  on 1/28 with back pain, XR was unremarkable other than mild disc narrowing, radiculopathy was suspected and medrol dose pack was given   Noted a history of sciatica pain several years ago - 2012   This feel worse than previous pain   Finished her steroid pack yesterday   Pain improved slightly and then worsened again over the weekend and has mostly been in bed since the weekend   Had a hard time sleeping   Denies any injury prior to onset   Had a pinched nerve when she delivered one of her children but does not know when the pinched nerve was and was discharged with difficulty walking and took a few weeks to feel better     Having shooting pain down her right leg, burning pain   No numbness or tingling   Sitting exacerbates the pain  Standing and walking is fine   Repositioning helps   Started wearing a back brace yesterday which feels better     Taking tylenol 1000mg once a day yesterday   Has not taken anything today  Not taking any advil     Going to [place] for a week, leaving today    Having pelvic cramps on the right   No saddle anesthesia   No weakness in the legs     History/Other:    Chief Complaint Reviewed and Verified  No Further Nursing Notes to   Review  Tobacco Reviewed  Allergies Reviewed  Medications Reviewed  OB   Status Reviewed         Tobacco:  She has never smoked tobacco.    Current Outpatient Medications   Medication Sig Dispense Refill    Meloxicam 15 MG Oral Tab Take 1 tablet (15 mg total) by mouth daily. 15 tablet 0    multivitamin Oral Chew Tab Chew 1 tablet by mouth daily.      methylPREDNISolone (MEDROL) 4 MG Oral Tablet Therapy Pack Dosepack: take as directed (Patient not taking: Reported on 2/3/2025) 1 each 0    ergocalciferol 1.25 MG (04754 UT) Oral Cap Take 1 capsule (50,000 Units total) by mouth once a week. (Patient not taking: Reported on 9/7/2024) 4 capsule 3     SUMAtriptan 50 MG Oral Tab Take one tablet at the first sign of a headache, May repeat in 2 hours if needed, a third dose may be taken 8 hours if needed. No more three doses in 24 hours.      Cholecalciferol (VITAMIN D) 50 MCG (2000 UT) Oral Cap Take by mouth.       Review of Systems:  Review of Systems  10 point review of systems otherwise negative with the exception of HPI and assessment and plan    Objective:   /78   Pulse 63   Ht 5' 5\" (1.651 m)   Wt 149 lb (67.6 kg)   LMP 10/19/2024 (Approximate)   SpO2 99%   BMI 24.79 kg/m²  Estimated body mass index is 24.79 kg/m² as calculated from the following:    Height as of this encounter: 5' 5\" (1.651 m).    Weight as of this encounter: 149 lb (67.6 kg).  Physical Exam  Vitals reviewed.   Constitutional:       General: She is not in acute distress.     Appearance: Normal appearance. She is well-developed.   Cardiovascular:      Rate and Rhythm: Normal rate and regular rhythm.      Heart sounds: Normal heart sounds.   Pulmonary:      Effort: Pulmonary effort is normal.      Breath sounds: Normal breath sounds.   Musculoskeletal:      Comments: Bilateral strength symmetrical BLE   Skin:     General: Skin is warm and dry.   Neurological:      Mental Status: She is alert and oriented to person, place, and time.         Assessment & Plan:   1. Acute right-sided low back pain with right-sided sciatica (Primary)  -     Meloxicam; Take 1 tablet (15 mg total) by mouth daily.  Dispense: 15 tablet; Refill: 0  -     Physical Therapy Referral - [place] Locations  Discussed indications for MRI or specialist referral if not improving in several weeks    JULIÁN Pearce, 2/3/2025, 9:56 AM

## 2025-02-03 NOTE — PATIENT INSTRUCTIONS
Call the office if your pain is not improving after about 4 weeks of physical therapy  Avoid advil (ibuprofen), motrin, while taking meloxicam.   Take meloxicam once a day with food.   You can take tylenol in between doses of meloxicam, not to exceed 4000mg of tylenol in 24 hours.

## 2025-02-13 ENCOUNTER — TELEPHONE (OUTPATIENT)
Dept: PHYSICAL THERAPY | Facility: HOSPITAL | Age: 45
End: 2025-02-13

## 2025-02-20 ENCOUNTER — APPOINTMENT (OUTPATIENT)
Dept: PHYSICAL THERAPY | Age: 45
End: 2025-02-20
Payer: COMMERCIAL

## 2025-02-25 ENCOUNTER — HOSPITAL ENCOUNTER (EMERGENCY)
Facility: HOSPITAL | Age: 45
Discharge: HOME | End: 2025-02-25
Attending: EMERGENCY MEDICINE
Payer: COMMERCIAL

## 2025-02-25 ENCOUNTER — APPOINTMENT (OUTPATIENT)
Dept: CT IMAGING | Facility: HOSPITAL | Age: 45
End: 2025-02-25
Attending: EMERGENCY MEDICINE
Payer: COMMERCIAL

## 2025-02-25 ENCOUNTER — HOSPITAL ENCOUNTER (OUTPATIENT)
Age: 45
Discharge: ANOTHER HOSPITAL | End: 2025-02-25
Payer: COMMERCIAL

## 2025-02-25 ENCOUNTER — HOSPITAL ENCOUNTER (EMERGENCY)
Facility: HOSPITAL | Age: 45
Discharge: STILL ADMITTED OR ADMINISTRATIVE | End: 2025-02-25
Payer: COMMERCIAL

## 2025-02-25 VITALS
TEMPERATURE: 98 F | HEART RATE: 57 BPM | OXYGEN SATURATION: 100 % | DIASTOLIC BLOOD PRESSURE: 80 MMHG | RESPIRATION RATE: 18 BRPM | SYSTOLIC BLOOD PRESSURE: 141 MMHG

## 2025-02-25 VITALS
RESPIRATION RATE: 20 BRPM | HEART RATE: 54 BPM | TEMPERATURE: 98 F | DIASTOLIC BLOOD PRESSURE: 89 MMHG | OXYGEN SATURATION: 98 % | SYSTOLIC BLOOD PRESSURE: 123 MMHG

## 2025-02-25 DIAGNOSIS — R10.84 ABDOMINAL PAIN, GENERALIZED: Primary | ICD-10-CM

## 2025-02-25 DIAGNOSIS — R10.9 ABDOMINAL PAIN, ACUTE: Primary | ICD-10-CM

## 2025-02-25 LAB
ALBUMIN SERPL-MCNC: 4.6 G/DL (ref 3.2–4.8)
ALP LIVER SERPL-CCNC: 59 U/L
ALT SERPL-CCNC: 18 U/L
ANION GAP SERPL CALC-SCNC: 7 MMOL/L (ref 0–18)
AST SERPL-CCNC: 23 U/L (ref ?–34)
B-HCG UR QL: NEGATIVE
BASOPHILS # BLD AUTO: 0.03 X10(3) UL (ref 0–0.2)
BASOPHILS NFR BLD AUTO: 0.4 %
BILIRUB DIRECT SERPL-MCNC: <0.1 MG/DL (ref ?–0.3)
BILIRUB SERPL-MCNC: 0.4 MG/DL (ref 0.3–1.2)
BILIRUB UR QL: NEGATIVE
BUN BLD-MCNC: 14 MG/DL (ref 9–23)
BUN/CREAT SERPL: 20 (ref 10–20)
CALCIUM BLD-MCNC: 9.1 MG/DL (ref 8.7–10.4)
CHLORIDE SERPL-SCNC: 105 MMOL/L (ref 98–112)
CLARITY UR: CLEAR
CO2 SERPL-SCNC: 28 MMOL/L (ref 21–32)
COLOR UR: COLORLESS
CREAT BLD-MCNC: 0.7 MG/DL
DEPRECATED RDW RBC AUTO: 36.9 FL (ref 35.1–46.3)
EGFRCR SERPLBLD CKD-EPI 2021: 109 ML/MIN/1.73M2 (ref 60–?)
EOSINOPHIL # BLD AUTO: 0.4 X10(3) UL (ref 0–0.7)
EOSINOPHIL NFR BLD AUTO: 5.4 %
ERYTHROCYTE [DISTWIDTH] IN BLOOD BY AUTOMATED COUNT: 12.1 % (ref 11–15)
GLUCOSE BLD-MCNC: 98 MG/DL (ref 70–99)
GLUCOSE UR-MCNC: NORMAL MG/DL
HCT VFR BLD AUTO: 37.1 %
HGB BLD-MCNC: 12.1 G/DL
HGB UR QL STRIP.AUTO: NEGATIVE
IMM GRANULOCYTES # BLD AUTO: 0.02 X10(3) UL (ref 0–1)
IMM GRANULOCYTES NFR BLD: 0.3 %
KETONES UR-MCNC: NEGATIVE MG/DL
LEUKOCYTE ESTERASE UR QL STRIP.AUTO: NEGATIVE
LIPASE SERPL-CCNC: 29 U/L (ref 12–53)
LYMPHOCYTES # BLD AUTO: 2.13 X10(3) UL (ref 1–4)
LYMPHOCYTES NFR BLD AUTO: 28.7 %
MCH RBC QN AUTO: 27.4 PG (ref 26–34)
MCHC RBC AUTO-ENTMCNC: 32.6 G/DL (ref 31–37)
MCV RBC AUTO: 83.9 FL
MONOCYTES # BLD AUTO: 0.6 X10(3) UL (ref 0.1–1)
MONOCYTES NFR BLD AUTO: 8.1 %
NEUTROPHILS # BLD AUTO: 4.25 X10 (3) UL (ref 1.5–7.7)
NEUTROPHILS # BLD AUTO: 4.25 X10(3) UL (ref 1.5–7.7)
NEUTROPHILS NFR BLD AUTO: 57.1 %
NITRITE UR QL STRIP.AUTO: NEGATIVE
OSMOLALITY SERPL CALC.SUM OF ELEC: 290 MOSM/KG (ref 275–295)
PH UR: 6 [PH] (ref 5–8)
PLATELET # BLD AUTO: 296 10(3)UL (ref 150–450)
POTASSIUM SERPL-SCNC: 5.1 MMOL/L (ref 3.5–5.1)
PROT SERPL-MCNC: 7 G/DL (ref 5.7–8.2)
PROT UR-MCNC: NEGATIVE MG/DL
RBC # BLD AUTO: 4.42 X10(6)UL
SODIUM SERPL-SCNC: 140 MMOL/L (ref 136–145)
SP GR UR STRIP: 1.01 (ref 1–1.03)
UROBILINOGEN UR STRIP-ACNC: NORMAL
WBC # BLD AUTO: 7.4 X10(3) UL (ref 4–11)

## 2025-02-25 PROCEDURE — 80076 HEPATIC FUNCTION PANEL: CPT | Performed by: EMERGENCY MEDICINE

## 2025-02-25 PROCEDURE — 83690 ASSAY OF LIPASE: CPT | Performed by: EMERGENCY MEDICINE

## 2025-02-25 PROCEDURE — 80048 BASIC METABOLIC PNL TOTAL CA: CPT | Performed by: EMERGENCY MEDICINE

## 2025-02-25 PROCEDURE — 81025 URINE PREGNANCY TEST: CPT

## 2025-02-25 PROCEDURE — 99214 OFFICE O/P EST MOD 30 MIN: CPT | Performed by: NURSE PRACTITIONER

## 2025-02-25 PROCEDURE — 99284 EMERGENCY DEPT VISIT MOD MDM: CPT

## 2025-02-25 PROCEDURE — 81003 URINALYSIS AUTO W/O SCOPE: CPT | Performed by: EMERGENCY MEDICINE

## 2025-02-25 PROCEDURE — 36415 COLL VENOUS BLD VENIPUNCTURE: CPT

## 2025-02-25 PROCEDURE — 74177 CT ABD & PELVIS W/CONTRAST: CPT | Performed by: EMERGENCY MEDICINE

## 2025-02-25 PROCEDURE — 85025 COMPLETE CBC W/AUTO DIFF WBC: CPT | Performed by: EMERGENCY MEDICINE

## 2025-02-25 NOTE — ED PROVIDER NOTES
Patient Seen in: [facility]      History     Chief Complaint   Patient presents with    Abdominal Pain     Stated Complaint: Abdominal Pain    Subjective:   HPI      44-year-old female presents with generalized abdominal pain, that worsens with eating.  Took 1 capsule of Nexium without improvement.  Noticed epigastric pain and bloating that has worsened x 7 days.  Has been on steroids and meloxicam for acute lower back pain with sciatica.  No dysuria/hematuria noted.  No vaginal discharge/uterine bleeding.  Amenorrhea has been worked up for her OB.  May be an early menopause.  No increased back pain with this issue.  No nausea or vomiting.  No diarrhea or constipation noted.  Decreased p.o. intake related to pain and bloating.  Rates pain 10 out of 10.    Objective:     Past Medical History:    Anxiety    Calculus of kidney    Depression    Hyperlipidemia    Irregular periods              Past Surgical History:   Procedure Laterality Date    Other surgical history      lithotripsy x 2 for kidney stones                Social History     Socioeconomic History    Marital status:    Tobacco Use    Smoking status: Never     Passive exposure: Yes    Smokeless tobacco: Never    Tobacco comments:     Sparingly during that time   Vaping Use    Vaping status: Never Used   Substance and Sexual Activity    Alcohol use: Never    Drug use: Never   Other Topics Concern    Caffeine Concern No    Exercise Yes     Comment: 2/3 times a week    Seat Belt Yes    Special Diet No    Stress Concern Yes    Weight Concern No     Social Drivers of Health     Food Insecurity: Not on File (9/10/2022)    [facility] from [facility]    Food Insecurity     Food: 0   Transportation Needs: Not on File (9/10/2022)    [facility] from [facility]    Transportation Needs     Transportation: 0    Received from [employer]    [facility]              Review of Systems    Positive for stated complaint: Abdominal  Pain  Other systems are as noted in HPI.  Constitutional and vital signs reviewed.      All other systems reviewed and negative except as noted above.    Physical Exam     ED Triage Vitals [02/25/25 1627]   /80   Pulse 57   Resp 18   Temp 98.4 °F (36.9 °C)   Temp src Oral   SpO2 100 %   O2 Device None (Room air)       Current Vitals:   Vital Signs  BP: 141/80  Pulse: 57  Resp: 18  Temp: 98.4 °F (36.9 °C)  Temp src: Oral    Oxygen Therapy  SpO2: 100 %  O2 Device: None (Room air)        Physical Exam  Vitals and nursing note reviewed.   Constitutional:       General: She is not in acute distress.     Appearance: She is not toxic-appearing.   HENT:      Head: Normocephalic.      Nose: Nose normal. No congestion or rhinorrhea.      Mouth/Throat:      Mouth: Mucous membranes are moist.   Cardiovascular:      Rate and Rhythm: Normal rate.   Pulmonary:      Effort: Pulmonary effort is normal. No respiratory distress.   Abdominal:      General: Abdomen is flat.      Tenderness: There is generalized abdominal tenderness.      Hernia: No hernia is present.   Genitourinary:     Comments: Deferred  Musculoskeletal:         General: Normal range of motion.      Cervical back: Normal range of motion.   Skin:     General: Skin is warm and dry.      Capillary Refill: Capillary refill takes less than 2 seconds.   Neurological:      General: No focal deficit present.      Mental Status: She is alert.             ED Course   Labs Reviewed - No data to display                MDM              Medical Decision Making  44-year-old female presents with generalized abdominal pain that may be related to GERD versus ulcer given NSAID use, steroids.  Only 1 dose of PPI.  Related to infectious process.  Given the limitations of the immediate care and the likely need for advanced lab testing and/or imaging not available here the patient will be sent to the emergency department for further evaluation and management.   Differential diagnosis  reflecting the complexity of care include: GERD, gastric ulcer, peptic ulcer, generalized abdominal pain, cholelithiasis, cholecystitis    Comorbidities that add complexity to management include: Previous history of back pain, chronic medication use, amenorrhea    External chart review was done and was noted:Yes    History obtained by an independent source was from: Patient    Discussions of management was done with:Patient    My independent interpretation of studies of: N/A    Diagnostic tests and medications considered but not ordered were: Labs including LFTs, IV fluids, pain control    Social determinants of health that affect care:NA    Shared decision making was done by Self, Patient          Disposition and Plan     Clinical Impression:  1. Abdominal pain, generalized         Disposition:  Ic to ed  2/25/2025  4:55 pm    Follow-up:  No follow-up provider specified.        Medications Prescribed:  Discharge Medication List as of 2/25/2025  5:01 PM              Supplementary Documentation:

## 2025-02-25 NOTE — ED INITIAL ASSESSMENT (HPI)
Pt c/o epigastric pain abnd bloating x7 days, worse x3 days.  States pain better with antacid.  States has been on steroids and meloxicam for back pain.   No urinary symptoms.

## 2025-02-25 NOTE — ED INITIAL ASSESSMENT (HPI)
Pt sent by IC, pt c/o mid abd pain x 7 days, pt states pain has gotten worse since it started.   Pt states she has been on meloxicam and steroids for back pain.  +nausea and bloating

## 2025-02-26 NOTE — ED PROVIDER NOTES
Patient Seen in: [facility] Emergency Department      History     Chief Complaint   Patient presents with    Abdomen/Flank Pain     Stated Complaint: Abdominal Pain    Subjective:   HPI    Pt Is 43 yo F who p/w intermittent dull pain in epigastrum x 1 week. Started after taking prednisone and meloxicam for back pains. Does seem to improve with eating or lying on her side but pain lasting longer and progressively getting worse. Pain also improves with antacids. Today the pain got worse after eating. No fevers, vomiting or diarrhea.  No ETOH.     Objective:     Past Medical History:    Anxiety    Calculus of kidney    Depression    Hyperlipidemia    Irregular periods              Past Surgical History:   Procedure Laterality Date    Other surgical history      lithotripsy x 2 for kidney stones                Social History     Socioeconomic History    Marital status:    Tobacco Use    Smoking status: Never     Passive exposure: Yes    Smokeless tobacco: Never    Tobacco comments:     Sparingly during that time   Vaping Use    Vaping status: Never Used   Substance and Sexual Activity    Alcohol use: Never    Drug use: Never   Other Topics Concern    Caffeine Concern No    Exercise Yes     Comment: 2/3 times a week    Seat Belt Yes    Special Diet No    Stress Concern Yes    Weight Concern No     Social Drivers of Health     Food Insecurity: Not on File (9/10/2022)    [facility] from [facility]    Food Insecurity     Food: 0   Transportation Needs: Not on File (9/10/2022)    [facility] from [facility]    Transportation Needs     Transportation: 0    Received from [employer]    [facility]                  Physical Exam     ED Triage Vitals [02/25/25 1727]   /81   Pulse 65   Resp 16   Temp 98.1 °F (36.7 °C)   Temp src Oral   SpO2 99 %   O2 Device None (Room air)       Current Vitals:   Vital Signs  BP: 139/81  Pulse: 65  Resp: 16  Temp: 98.1 °F (36.7 °C)  Temp src: Oral    Oxygen  Therapy  SpO2: 99 %  O2 Device: None (Room air)        Physical Exam  GENERAL: No acute distress, awake and alert  HEENT: EOMI, PERRL  Neck: supple  CV: RRR, no murmurs  Resp: CTAB, no wheezes or retractions  Ab: soft, nontender, no distension or masses, bs normal  Extremities: FROM of all extremities  Neuro: CN intact, normal speech, normal gait, 5/5 motor strength in all extremities, no focal deficits  SKIN: warm, dry, no rashes      ED Course     Labs Reviewed   URINALYSIS WITH CULTURE REFLEX - Abnormal; Notable for the following components:       Result Value    Urine Color Colorless (*)     All other components within normal limits   BASIC METABOLIC PANEL (8) - Normal   HEPATIC FUNCTION PANEL (7) - Normal   LIPASE - Normal   POCT PREGNANCY URINE - Normal   CBC WITH DIFFERENTIAL WITH PLATELET   RAINBOW DRAW LAVENDER   RAINBOW DRAW LIGHT GREEN        MDM      Medical Decision Making  Ddx: PUD, GERD, gastritis, pancreatitis    Labs/ct reassuring  Pt notified of results  Started taking nexium yesterday-will continue with meds. D/w patient dietary changes and GI f/u. Feels comfortable with discharge      Amount and/or Complexity of Data Reviewed  External Data Reviewed: notes.     Details: IC notes from earlier today reviewed  Labs: ordered.        Disposition and Plan     Clinical Impression:  1. Abdominal pain, acute         Disposition:  Discharge  2/25/2025  8:10 pm    Follow-up:  Reed Lira MD  [facility] [address] [address]  [phone]    Follow up            Medications Prescribed:  Current Discharge Medication List              Supplementary Documentation:

## 2025-02-28 ENCOUNTER — APPOINTMENT (OUTPATIENT)
Dept: PHYSICAL THERAPY | Age: 45
End: 2025-02-28
Payer: COMMERCIAL

## 2025-03-02 ENCOUNTER — PATIENT MESSAGE (OUTPATIENT)
Dept: INTERNAL MEDICINE CLINIC | Facility: CLINIC | Age: 45
End: 2025-03-02

## 2025-03-03 ENCOUNTER — APPOINTMENT (OUTPATIENT)
Dept: PHYSICAL THERAPY | Age: 45
End: 2025-03-03
Payer: COMMERCIAL

## 2025-03-04 NOTE — TELEPHONE ENCOUNTER
Called and left msg then called back  spoke to pt   Saw dr spring - if pain continues then will do scope   She is feeling better   Closing encounter

## 2025-03-07 ENCOUNTER — APPOINTMENT (OUTPATIENT)
Dept: PHYSICAL THERAPY | Age: 45
End: 2025-03-07
Payer: COMMERCIAL

## 2025-03-10 ENCOUNTER — APPOINTMENT (OUTPATIENT)
Dept: PHYSICAL THERAPY | Age: 45
End: 2025-03-10
Payer: COMMERCIAL

## 2025-05-07 ENCOUNTER — HOSPITAL ENCOUNTER (OUTPATIENT)
Dept: MAMMOGRAPHY | Age: 45
Discharge: HOME | End: 2025-05-07
Attending: INTERNAL MEDICINE
Payer: COMMERCIAL

## 2025-05-07 DIAGNOSIS — Z12.31 ENCOUNTER FOR SCREENING MAMMOGRAM FOR MALIGNANT NEOPLASM OF BREAST: ICD-10-CM

## 2025-05-07 PROCEDURE — 77063 BREAST TOMOSYNTHESIS BI: CPT | Performed by: OBSTETRICS & GYNECOLOGY

## 2025-05-07 PROCEDURE — 77067 SCR MAMMO BI INCL CAD: CPT | Performed by: OBSTETRICS & GYNECOLOGY

## 2025-07-14 ENCOUNTER — OFFICE VISIT (OUTPATIENT)
Dept: SURGERY | Facility: CLINIC | Age: 45
End: 2025-07-14
Payer: COMMERCIAL

## 2025-07-14 VITALS
WEIGHT: 154 LBS | SYSTOLIC BLOOD PRESSURE: 120 MMHG | HEIGHT: 65 IN | DIASTOLIC BLOOD PRESSURE: 62 MMHG | BODY MASS INDEX: 25.66 KG/M2

## 2025-07-14 DIAGNOSIS — N95.1 PERIMENOPAUSE: ICD-10-CM

## 2025-07-14 DIAGNOSIS — Z01.419 WOMEN'S ANNUAL ROUTINE GYNECOLOGICAL EXAMINATION: Primary | ICD-10-CM

## 2025-07-14 DIAGNOSIS — N95.1 MENOPAUSAL SYMPTOMS: ICD-10-CM

## 2025-07-14 RX ORDER — MULTIVIT-MIN/IRON/FOLIC ACID/K 18-600-40
CAPSULE ORAL
COMMUNITY

## 2025-07-14 NOTE — PROGRESS NOTES
GYN ANNUAL    2025  9:35 AM    Chief Complaint   Patient presents with    Annual     Annual exam    .    HPI: Patient is a 44 year old  LMP 10/2024 presents for annual gyn exam with concerns about hormonal changes and feeling out of balance with episodic anxiety and stress Cycles remain irregular with minimal vasomotor symptoms. Last FSH 3/2024 of 55 in menopausal range but not yet experienced absence of periods for greater than 12 months. Discussed trial with adaptogenic Ashwagandha root twice daily and consultation for Acupuncture.      OB History    Para Term  AB Living   3 3 2 1  3   SAB IAB Ectopic Multiple Live Births       3      # Outcome Date GA Lbr Jay/2nd Weight Sex Type Anes PTL Lv   3 Term     F NORMAL SPONT   ZAINAB   2 Term 11 40w0d   F NORMAL SPONT   ZAINAB   1  08 36w0d   F NORMAL SPONT   ZAINAB         GYN hx:    Hx Prior Abnormal Pap: No  Pap Date: 24  Pap Result Notes: wnl/-hpv  Follow Up Recommendation: Last Mammo: 25=negative  CONTRACEPTION: None  LAST MAMMOGRAM: 2025      Current Medications[1]    Past Medical History[2]  Past Surgical History[3]  Allergies[4]  Family History[5]  Set as collapsible by default.[6]  Social History     Social History Narrative    Not on file       ROS:     Review of Systems:  A comprehensive 10 point ROS was completed. All pertinent positives and negatives noted in the HPI        /62   Ht 65\"   Wt 154 lb (69.9 kg)   LMP 10/19/2024 (Approximate)   BMI 25.63 kg/m²     Exam:   GENERAL: well developed, well nourished, in no apparent distress  SKIN: no rashes, no lesions  HEENT: normal  LUNGS: respiration unlabored  CARDIOVASCULAR: no peripheral edema or varicosities, skin warm and dry  BREASTS: bilaterally nontender, no palpable masses, no nipple discharge, no skin changes, no axillary adenopathy  ABDOMEN: Soft, non distended; non tender, no masses  GYNE/:   External Genitalia: normal, no lesions, good  perineal support  Urethra: meatus normal  Bladder: well supported  Vagina: normal mucosa, no lesions, no discharge   Uterus: normal size, mobile, nontender  Cervix:  normal os, no lesions or bleeding  Adnexa: normal size, bilaterally nontender, no palpable masses  Cul-de-sac: normal  R/V: normal perineum, no hemorrhoids  EXTREMITIES: nontender without edema      A/P: Patient is 44 year old female here for well-woman exam.     1. Women's annual routine gynecological examination  - Normal exam    2. Perimenopause  - Start oral Ashwagandha 300 mg twice daily  - Referral placed for Acupuncture      Total time spent = 20 minutes  >50% = face to face discussion and coordination of care        7/14/2025  Marlene Newell MD                    [1]   Current Outpatient Medications   Medication Sig Dispense Refill    Cholecalciferol (VITAMIN D) 50 MCG (2000 UT) Oral Cap Take by mouth.      NON FORMULARY Berberin plant herb oral cap      multivitamin Oral Chew Tab Chew 1 tablet by mouth daily.     [2]   Past Medical History:   Amenorrhea    It was irregular but still came. Then in 2021 woyld ho 3-4 months at a time. Since i stopped birth control in october habe not gotten    Anemia    Anxiety    Calculus of kidney    Depression    Hyperlipidemia    Irregular periods   [3]   Past Surgical History:  Procedure Laterality Date    Other surgical history      lithotripsy x 2 for kidney stones   [4] No Known Allergies  [5]   Family History  Problem Relation Age of Onset    Other (hyperthyroidism) Mother     Depression Mother     Thyroid Cancer Mother     Hypertension Father     Diabetes Father     Heart Disorder Father     Ovarian Cancer Maternal Aunt         post    Breast Cancer Paternal Aunt         age unk    Prostate Cancer Maternal Uncle     Pancreatic Cancer Maternal Uncle         post    Prostate Cancer Maternal Uncle     Pancreatic Cancer Maternal Uncle     Pancreatic Cancer Maternal Uncle    [6]   Social  History  Socioeconomic History    Marital status:    Tobacco Use    Smoking status: Passive Smoke Exposure - Never Smoker     Passive exposure: Yes    Smokeless tobacco: Never    Tobacco comments:     Sparingly during that time   Vaping Use    Vaping status: Never Used   Substance and Sexual Activity    Alcohol use: Never    Drug use: Never

## 2025-07-21 ENCOUNTER — NURSE ONLY (OUTPATIENT)
Dept: INTEGRATIVE MEDICINE | Facility: CLINIC | Age: 45
End: 2025-07-21

## 2025-07-22 NOTE — PROGRESS NOTES
Reiki Intake and Documentation    Patient Name: Ovidio Ponce   YOB: 1980   Medical Record Number: [id number]   CSN: [id number]     Date of Visit: 7/21/2025    Reason for scheduling reiki session: Stress management  Name of referring physician: Self referred    Client's description of overall health status: Client stated that she has been dealing with significant stress and anxiety.  She is a client of Dr. Emerson and noted that [facility] [place] also offers Reiki sessions. She is new to Reiki and decided to schedule a session.  States that she has a very stressful job.  She is also dealing with some personal past trauma.  Has been seeing a therapist on a regular basis to process this experience. States that she is on a healing journey. Stated that stress tends to land in her heart center and lower back.     Instructed client regarding the nature of Reiki, benefits and how session is conducted.  She verbalized understanding and gave permission for light touch.     Client's major concern(s) regarding health: Client states that some of her past experiences has made her sensitive to guided imagery and progressive muscle relaxation (PMR). States that these can be triggering and cause anxiety. Described and discussed PMR and visuals used during Reiki session. Client was agreeable to both--verbiage would be offered slowly with verbal check-ins with client.  She understood that she could end the PMR/visuals if she felt uncomfortable at any time.     Reiki practitioner observation / treatment: Session began with series of head to toe sweeps and deep breaths to promote relaxation.  PMR and visuals were spoken slowly and client asked frequently for feedback.  She gave positive feedback throughout.  Byosen noted at sacral / root area at level 3-4; heart area at level 3.  Focused work to these areas along with standard hand positions.  Client rested quietly.  Muscle twitching noted through out client's physical body.      Client experience / post session evaluation: Client stated, \"I slept.\"  Visualized the color blue.  Stated that PMR / visuals felt comfortable with no triggering.  Stated that she was able to use the imagery of clouds to quiet her mind. Emotional support offered. Gave client information about Reiki, session aftercare and Reiki Principles.  Recommended additional sessions. Return prn.     Intake Form Data:  Referral Source: Self referred  Pre-Reiki Therapy (Pain): 0  Pre-Reiki Therapy (Anxiety): 6     Post-Reiki Therapy (Pain): 0  Post-Reiki Therapy (Anxiety): 4    Electronically Signed by:    Hansa BURROWS RN, 7/21/2025     Epic Template #[id number]